# Patient Record
Sex: FEMALE | NOT HISPANIC OR LATINO | URBAN - METROPOLITAN AREA
[De-identification: names, ages, dates, MRNs, and addresses within clinical notes are randomized per-mention and may not be internally consistent; named-entity substitution may affect disease eponyms.]

---

## 2021-07-27 ENCOUNTER — WEB ENCOUNTER (OUTPATIENT)
Dept: URBAN - METROPOLITAN AREA CLINIC 40 | Facility: CLINIC | Age: 36
End: 2021-07-27

## 2021-07-27 ENCOUNTER — OFFICE VISIT (OUTPATIENT)
Dept: URBAN - METROPOLITAN AREA CLINIC 40 | Facility: CLINIC | Age: 36
End: 2021-07-27
Payer: OTHER GOVERNMENT

## 2021-07-27 DIAGNOSIS — K59.04 CHRONIC IDIOPATHIC CONSTIPATION: ICD-10-CM

## 2021-07-27 PROCEDURE — 99204 OFFICE O/P NEW MOD 45 MIN: CPT | Performed by: STUDENT IN AN ORGANIZED HEALTH CARE EDUCATION/TRAINING PROGRAM

## 2021-07-27 RX ORDER — SODIUM SULFATE, MAGNESIUM SULFATE, AND POTASSIUM CHLORIDE 17.75; 2.7; 2.25 G/1; G/1; G/1
12 TABLETS TABLET ORAL
Qty: 24 TABLETS | Refills: 0 | OUTPATIENT
Start: 2021-07-27 | End: 2021-07-28

## 2021-07-27 RX ORDER — ESTRADIOL 0.5 MG/1
1 TABLET TABLET ORAL ONCE A DAY
Status: ACTIVE | COMMUNITY

## 2021-07-27 NOTE — HPI-TODAY'S VISIT:
35-year-old female New to me Comes in today for evaluation of chronic constipation.  Patient denies any use of antimotility medications.  This has been a long-term issue with her.  Patient has been using over-the-counter laxatives with partial relief so far.  Patient has been using these medications only when she is unable to move her bowels beyond 3 days.  Mild nausea but no vomiting.  Abdomen discomfort mostly in the lower part specially when she is unable to move her bowels for few days.  Otherwise denies seeing any blood or mucus in the stool.  No reflux or dysphagia. Patient also has been tested positive for BRCA mutation.  Patient has extensive history of breast cancer, ovarian cancer and uterine cancer in her family in multiple generations.

## 2021-07-28 LAB
BUN/CREATININE RATIO: 14
BUN: 10
CARBON DIOXIDE, TOTAL: 22
CHLORIDE: 101
CREATININE: 0.71
EGFR IF AFRICN AM: 128
EGFR IF NONAFRICN AM: 111
GLUCOSE: 101
HEMATOCRIT: 40.7
HEMOGLOBIN: 13.8
MCH: 30.3
MCHC: 33.9
MCV: 89
NRBC: (no result)
PLATELETS: 260
POTASSIUM: 4.4
RBC: 4.56
RDW: 12.7
SODIUM: 137
TSH: 1.45
WBC: 6.5

## 2021-08-16 ENCOUNTER — OFFICE VISIT (OUTPATIENT)
Dept: URBAN - METROPOLITAN AREA CLINIC 109 | Facility: CLINIC | Age: 36
End: 2021-08-16

## 2021-09-02 ENCOUNTER — OFFICE VISIT (OUTPATIENT)
Dept: URBAN - METROPOLITAN AREA SURGERY CENTER 30 | Facility: SURGERY CENTER | Age: 36
End: 2021-09-02

## 2021-10-05 ENCOUNTER — OFFICE VISIT (OUTPATIENT)
Dept: URBAN - METROPOLITAN AREA CLINIC 40 | Facility: CLINIC | Age: 36
End: 2021-10-05

## 2021-10-28 ENCOUNTER — OFFICE VISIT (OUTPATIENT)
Dept: URBAN - METROPOLITAN AREA SURGERY CENTER 30 | Facility: SURGERY CENTER | Age: 36
End: 2021-10-28

## 2022-02-17 ENCOUNTER — TELEPHONE ENCOUNTER (OUTPATIENT)
Dept: URBAN - METROPOLITAN AREA CLINIC 92 | Facility: CLINIC | Age: 37
End: 2022-02-17

## 2022-02-17 ENCOUNTER — OFFICE VISIT (OUTPATIENT)
Dept: URBAN - METROPOLITAN AREA SURGERY CENTER 30 | Facility: SURGERY CENTER | Age: 37
End: 2022-02-17
Payer: OTHER GOVERNMENT

## 2022-02-17 DIAGNOSIS — K59.04 CHRONIC IDIOPATHIC CONSTIPATION: ICD-10-CM

## 2022-02-17 PROCEDURE — G8907 PT DOC NO EVENTS ON DISCHARG: HCPCS | Performed by: STUDENT IN AN ORGANIZED HEALTH CARE EDUCATION/TRAINING PROGRAM

## 2022-02-17 PROCEDURE — 45330 DIAGNOSTIC SIGMOIDOSCOPY: CPT | Performed by: STUDENT IN AN ORGANIZED HEALTH CARE EDUCATION/TRAINING PROGRAM

## 2022-02-17 RX ORDER — LINACLOTIDE 290 UG/1
1 CAPSULE AT LEAST 30 MINUTES BEFORE THE FIRST MEAL OF THE DAY ON AN EMPTY STOMACH CAPSULE, GELATIN COATED ORAL ONCE A DAY
Qty: 90 | Refills: 3 | OUTPATIENT
Start: 2022-02-17 | End: 2023-02-12

## 2022-02-17 RX ORDER — ESTRADIOL 0.5 MG/1
1 TABLET TABLET ORAL ONCE A DAY
Status: ACTIVE | COMMUNITY

## 2022-02-17 RX ORDER — SODIUM PICOSULFATE, MAGNESIUM OXIDE, AND ANHYDROUS CITRIC ACID 10; 3.5; 12 MG/160ML; G/160ML; G/160ML
160 ML LIQUID ORAL
Qty: 320 MILLILITER | Refills: 0 | OUTPATIENT
Start: 2022-02-17 | End: 2022-02-18

## 2022-04-07 ENCOUNTER — OFFICE VISIT (OUTPATIENT)
Dept: URBAN - METROPOLITAN AREA SURGERY CENTER 30 | Facility: SURGERY CENTER | Age: 37
End: 2022-04-07
Payer: OTHER GOVERNMENT

## 2022-04-07 DIAGNOSIS — K59.09 CHANGE IN BOWEL MOVEMENTS INTERMITTENT CONSTIPATION. URGENCY IN THE MORNING.: ICD-10-CM

## 2022-04-07 DIAGNOSIS — Z80.41 FAMILY HISTORY OF OVARIAN CANCER: ICD-10-CM

## 2022-04-07 DIAGNOSIS — Z80.3 FAMILY HISTORY OF BREAST CANCER: ICD-10-CM

## 2022-04-07 PROCEDURE — G8907 PT DOC NO EVENTS ON DISCHARG: HCPCS | Performed by: STUDENT IN AN ORGANIZED HEALTH CARE EDUCATION/TRAINING PROGRAM

## 2022-04-07 PROCEDURE — 45378 DIAGNOSTIC COLONOSCOPY: CPT | Performed by: STUDENT IN AN ORGANIZED HEALTH CARE EDUCATION/TRAINING PROGRAM

## 2022-04-28 ENCOUNTER — OFFICE VISIT (OUTPATIENT)
Dept: URBAN - METROPOLITAN AREA SURGERY CENTER 30 | Facility: SURGERY CENTER | Age: 37
End: 2022-04-28

## 2022-07-20 ENCOUNTER — TELEPHONE ENCOUNTER (OUTPATIENT)
Dept: URBAN - METROPOLITAN AREA CLINIC 40 | Facility: CLINIC | Age: 37
End: 2022-07-20

## 2022-07-20 RX ORDER — LINACLOTIDE 290 UG/1
1 CAPSULE AT LEAST 30 MINUTES BEFORE THE FIRST MEAL OF THE DAY ON AN EMPTY STOMACH CAPSULE, GELATIN COATED ORAL ONCE A DAY
Qty: 90 | Refills: 0
Start: 2022-02-17 | End: 2022-10-18

## 2022-08-19 ENCOUNTER — OFFICE VISIT (OUTPATIENT)
Dept: URBAN - METROPOLITAN AREA TELEHEALTH 2 | Facility: TELEHEALTH | Age: 37
End: 2022-08-19
Payer: OTHER GOVERNMENT

## 2022-08-19 VITALS — HEIGHT: 64 IN | WEIGHT: 175 LBS | BODY MASS INDEX: 29.88 KG/M2

## 2022-08-19 DIAGNOSIS — K59.04 CHRONIC IDIOPATHIC CONSTIPATION: ICD-10-CM

## 2022-08-19 PROCEDURE — 99213 OFFICE O/P EST LOW 20 MIN: CPT | Performed by: INTERNAL MEDICINE

## 2022-08-19 RX ORDER — LINACLOTIDE 290 UG/1
1 CAPSULE AT LEAST 30 MINUTES BEFORE THE FIRST MEAL OF THE DAY ON AN EMPTY STOMACH CAPSULE, GELATIN COATED ORAL ONCE A DAY
Qty: 90 | Refills: 0 | Status: ACTIVE | COMMUNITY
Start: 2022-02-17 | End: 2022-10-18

## 2022-08-19 RX ORDER — LINACLOTIDE 290 UG/1
1 CAPSULE AT LEAST 30 MINUTES BEFORE THE FIRST MEAL OF THE DAY ON AN EMPTY STOMACH CAPSULE, GELATIN COATED ORAL ONCE A DAY
Qty: 90 | Refills: 0

## 2022-08-19 RX ORDER — ESTRADIOL 0.5 MG/1
1 TABLET TABLET ORAL ONCE A DAY
Status: ACTIVE | COMMUNITY

## 2022-08-19 NOTE — HPI-TODAY'S VISIT:
Ms. Pepe is a 37 year old Black female with a history of chronic constipation.  Patient denies any use of antimotility medications.  This has been a long-term issue with her.  Patient has been using over-the-counter laxatives with partial relief so far.  Patient has been using these medications only when she is unable to move her bowels beyond 3 days.  Mild nausea but no vomiting.  Abdomen discomfort mostly in the lower part specially when she is unable to move her bowels for few days.  Otherwise denies seeing any blood or mucus in the stool.  No reflux or dysphagia. Patient also has been tested positive for BRCA mutation.  Patient has extensive history of breast cancer, ovarian cancer and uterine cancer in her family in multiple generations. Normal colonoscopy this April, repeated after poor prep in [place]. A 5-year surveillance recommended. She admits that overall the Linzess 290 mcg daily has worked for her.  She is drinking more water and remains active, exercising weekly.  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  With this she admits to eating more starchy foods and cheese.  No new complaints.

## 2022-12-08 ENCOUNTER — TELEPHONE ENCOUNTER (OUTPATIENT)
Dept: URBAN - METROPOLITAN AREA CLINIC 40 | Facility: CLINIC | Age: 37
End: 2022-12-08

## 2022-12-29 ENCOUNTER — ERX REFILL RESPONSE (OUTPATIENT)
Dept: URBAN - METROPOLITAN AREA CLINIC 40 | Facility: CLINIC | Age: 37
End: 2022-12-29

## 2022-12-29 ENCOUNTER — TELEPHONE ENCOUNTER (OUTPATIENT)
Dept: URBAN - METROPOLITAN AREA CLINIC 40 | Facility: CLINIC | Age: 37
End: 2022-12-29

## 2022-12-29 RX ORDER — LINACLOTIDE 290 UG/1
1 CAPSULE AT LEAST 30 MINUTES BEFORE THE FIRST MEAL OF THE DAY ON AN EMPTY STOMACH CAPSULE, GELATIN COATED ORAL ONCE A DAY
Qty: 90 | Refills: 0 | OUTPATIENT

## 2022-12-29 RX ORDER — LINACLOTIDE 290 UG/1
TAKE 1 CAPSULE DAILY AT LEAST 30 MINUTES BEFORE THE FIRST MEAL OF THE DAY ON AN EMPTY STOMACH CAPSULE, GELATIN COATED ORAL
Qty: 90 CAPSULE | Refills: 3 | OUTPATIENT

## 2023-02-10 ENCOUNTER — TELEPHONE ENCOUNTER (OUTPATIENT)
Dept: URBAN - METROPOLITAN AREA CLINIC 40 | Facility: CLINIC | Age: 38
End: 2023-02-10

## 2023-03-03 ENCOUNTER — OFFICE VISIT (OUTPATIENT)
Dept: URBAN - METROPOLITAN AREA TELEHEALTH 2 | Facility: TELEHEALTH | Age: 38
End: 2023-03-03

## 2023-03-03 PROBLEM — 82934008: Status: ACTIVE | Noted: 2021-07-27

## 2023-03-03 RX ORDER — LINACLOTIDE 290 UG/1
TAKE 1 CAPSULE DAILY AT LEAST 30 MINUTES BEFORE THE FIRST MEAL OF THE DAY ON AN EMPTY STOMACH CAPSULE, GELATIN COATED ORAL
Qty: 90 CAPSULE | Refills: 3 | Status: ACTIVE | COMMUNITY

## 2023-03-03 RX ORDER — LINACLOTIDE 290 UG/1
1 CAPSULE AT LEAST 30 MINUTES BEFORE THE FIRST MEAL OF THE DAY ON AN EMPTY STOMACH CAPSULE, GELATIN COATED ORAL ONCE A DAY
Qty: 90 | Refills: 0

## 2023-03-03 RX ORDER — ESTRADIOL 0.5 MG/1
1 TABLET TABLET ORAL ONCE A DAY
Status: ACTIVE | COMMUNITY

## 2023-03-03 NOTE — HPI-TODAY'S VISIT:
Ms. Pepe is a 37 year old Black female with a history of chronic constipation.  Patient denies any use of antimotility medications.  This has been a long-term issue with her.  Patient has been using over-the-counter laxatives with partial relief so far.  Patient has been using these medications only when she is unable to move her bowels beyond 3 days.  Mild nausea but no vomiting.  Abdomen discomfort mostly in the lower part specially when she is unable to move her bowels for few days.  Otherwise denies seeing any blood or mucus in the stool.  No reflux or dysphagia. Patient also has been tested positive for BRCA mutation.  Patient has extensive history of breast cancer, ovarian cancer and uterine cancer in her family in multiple generations. Normal colonoscopy April 2022, repeated after poor prep in February. A 5-year surveillance recommended. With last visit 8/19/22, she admitted the Linzess 290 mcg daily has worked for her.  She is drinking more water and remains active, exercising weekly.  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  With this she admits to eating more starchy foods and cheese.  No new complaints.

## 2023-05-05 ENCOUNTER — OFFICE VISIT (OUTPATIENT)
Dept: URBAN - METROPOLITAN AREA TELEHEALTH 2 | Facility: TELEHEALTH | Age: 38
End: 2023-05-05
Payer: OTHER GOVERNMENT

## 2023-05-05 VITALS — WEIGHT: 175 LBS

## 2023-05-05 DIAGNOSIS — K59.09 CHANGE IN BOWEL MOVEMENTS INTERMITTENT CONSTIPATION. URGENCY IN THE MORNING.: ICD-10-CM

## 2023-05-05 DIAGNOSIS — R14.0 BLOATING: ICD-10-CM

## 2023-05-05 PROCEDURE — 99214 OFFICE O/P EST MOD 30 MIN: CPT | Performed by: INTERNAL MEDICINE

## 2023-05-05 RX ORDER — LINACLOTIDE 290 UG/1
1 CAPSULE AT LEAST 30 MINUTES BEFORE THE FIRST MEAL OF THE DAY ON AN EMPTY STOMACH CAPSULE, GELATIN COATED ORAL ONCE A DAY
Qty: 90 | Refills: 0 | Status: ACTIVE | COMMUNITY

## 2023-05-05 RX ORDER — LINACLOTIDE 290 UG/1
1 CAPSULE AT LEAST 30 MINUTES BEFORE THE FIRST MEAL OF THE DAY ON AN EMPTY STOMACH CAPSULE, GELATIN COATED ORAL ONCE A DAY
Qty: 90 CAPSULE | Refills: 1

## 2023-05-05 RX ORDER — ESTRADIOL 0.5 MG/1
1 TABLET TABLET ORAL ONCE A DAY
Status: ACTIVE | COMMUNITY

## 2023-05-05 RX ORDER — LINACLOTIDE 290 UG/1
TAKE 1 CAPSULE DAILY AT LEAST 30 MINUTES BEFORE THE FIRST MEAL OF THE DAY ON AN EMPTY STOMACH CAPSULE, GELATIN COATED ORAL
Qty: 90 CAPSULE | Refills: 3 | Status: ACTIVE | COMMUNITY

## 2023-05-05 NOTE — HPI-TODAY'S VISIT:
Ms. Pepe is a 37 year old Black female with a history of chronic constipation.  Patient denies any use of antimotility medications.  This has been a long-term issue with her.  Patient has been using over-the-counter laxatives with partial relief so far.  Patient has been using these medications only when she is unable to move her bowels beyond 3 days.  Mild nausea but no vomiting.  Abdomen discomfort mostly in the lower part specially when she is unable to move her bowels for few days.  Otherwise denies seeing any blood or mucus in the stool.  No reflux or dysphagia. Patient also has been tested positive for BRCA mutation.  Patient has extensive history of breast cancer, ovarian cancer and uterine cancer in her family in multiple generations. Normal colonoscopy April 2022, repeated after poor prep in February. A 5-year surveillance recommended. With last visit 8/19/22, she admitted the Linzess 290 mcg daily has worked for her.  She is drinking more water and remains active, exercising weekly.  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 Today, she reports eating less starchy foods.  She seems to do better in response to Linzess to 90 mcg when she takes it with a meal.  No side effects reported.  She has noticed some increased gas and bloating but admits that she often eats her lunch meal at work very quickly.  She has also been eating before lying flat at night.  Denies any nausea, vomiting or dysphagia.  Good appetite and has had some weight loss which was intentional.  No other new complaints.  She does need refills on her Linzess.

## 2023-08-11 ENCOUNTER — OFFICE VISIT (OUTPATIENT)
Dept: URBAN - METROPOLITAN AREA TELEHEALTH 2 | Facility: TELEHEALTH | Age: 38
End: 2023-08-11

## 2023-08-11 VITALS — HEIGHT: 64 IN | BODY MASS INDEX: 29.71 KG/M2 | WEIGHT: 174 LBS

## 2023-08-11 RX ORDER — LINACLOTIDE 290 UG/1
TAKE 1 CAPSULE DAILY AT LEAST 30 MINUTES BEFORE THE FIRST MEAL OF THE DAY ON AN EMPTY STOMACH CAPSULE, GELATIN COATED ORAL
Qty: 90 CAPSULE | Refills: 3 | Status: ACTIVE | COMMUNITY

## 2023-08-11 RX ORDER — LINACLOTIDE 290 UG/1
1 CAPSULE AT LEAST 30 MINUTES BEFORE THE FIRST MEAL OF THE DAY ON AN EMPTY STOMACH CAPSULE, GELATIN COATED ORAL ONCE A DAY
Qty: 90 CAPSULE | Refills: 1

## 2023-08-11 RX ORDER — ESTRADIOL 0.5 MG/1
1 TABLET TABLET ORAL ONCE A DAY
Status: ACTIVE | COMMUNITY

## 2023-08-11 RX ORDER — LINACLOTIDE 290 UG/1
1 CAPSULE AT LEAST 30 MINUTES BEFORE THE FIRST MEAL OF THE DAY ON AN EMPTY STOMACH CAPSULE, GELATIN COATED ORAL ONCE A DAY
Qty: 90 CAPSULE | Refills: 1 | Status: ACTIVE | COMMUNITY

## 2023-10-09 ENCOUNTER — OFFICE VISIT (OUTPATIENT)
Dept: URBAN - METROPOLITAN AREA TELEHEALTH 2 | Facility: TELEHEALTH | Age: 38
End: 2023-10-09
Payer: OTHER GOVERNMENT

## 2023-10-09 DIAGNOSIS — R14.0 BLOATING: ICD-10-CM

## 2023-10-09 DIAGNOSIS — K59.04 CHRONIC IDIOPATHIC CONSTIPATION: ICD-10-CM

## 2023-10-09 PROCEDURE — 99214 OFFICE O/P EST MOD 30 MIN: CPT | Performed by: PHYSICIAN ASSISTANT

## 2023-10-09 RX ORDER — LINACLOTIDE 290 UG/1
1 CAPSULE AT LEAST 30 MINUTES BEFORE THE FIRST MEAL OF THE DAY ON AN EMPTY STOMACH CAPSULE, GELATIN COATED ORAL ONCE A DAY
Qty: 90 CAPSULE | Refills: 1 | Status: ACTIVE | COMMUNITY

## 2023-10-09 RX ORDER — LINACLOTIDE 290 UG/1
1 CAPSULE AT LEAST 30 MINUTES BEFORE THE FIRST MEAL OF THE DAY ON AN EMPTY STOMACH CAPSULE, GELATIN COATED ORAL ONCE A DAY
Qty: 90 CAPSULE | Refills: 1

## 2023-10-09 RX ORDER — ESTRADIOL 0.5 MG/1
1 TABLET TABLET ORAL ONCE A DAY
Status: ACTIVE | COMMUNITY

## 2023-10-09 NOTE — HPI-TODAY'S VISIT:
Ms. Pepe is a 38 year old Black female with a history of chronic constipation.  Patient denies any use of antimotility medications.  This has been a long-term issue with her.  Patient has been using over-the-counter laxatives with partial relief so far.  Patient has been using these medications only when she is unable to move her bowels beyond 3 days.  Mild nausea but no vomiting.  Abdomen discomfort mostly in the lower part specially when she is unable to move her bowels for few days.  Otherwise denies seeing any blood or mucus in the stool.  No reflux or dysphagia. Patient also has been tested positive for BRCA mutation.  Patient has extensive history of breast cancer, ovarian cancer and uterine cancer in her family in multiple generations. Normal colonoscopy April 2022, repeated after poor prep in February. A 5-year surveillance recommended. With last visit 8/19/22, she admitted the Linzess 290 mcg daily has worked for her.  She is drinking more water and remains active, exercising weekly. Today, she reports eating less starchy foods.  She seems to do better in response to Linzess 290 mcg when she takes it with a meal.  No side effects reported.  Denies any nausea, vomiting or dysphagia.  Good appetite and has lost 10 pounds, which was intentional.  No other new complaints.  She does need refills on her Linzess.

## 2023-10-23 ENCOUNTER — TELEPHONE ENCOUNTER (OUTPATIENT)
Dept: URBAN - METROPOLITAN AREA CLINIC 39 | Facility: CLINIC | Age: 38
End: 2023-10-23

## 2023-11-17 ENCOUNTER — TELEPHONE ENCOUNTER (OUTPATIENT)
Dept: URBAN - METROPOLITAN AREA CLINIC 40 | Facility: CLINIC | Age: 38
End: 2023-11-17

## 2024-01-18 ENCOUNTER — ERX REFILL RESPONSE (OUTPATIENT)
Dept: URBAN - METROPOLITAN AREA CLINIC 40 | Facility: CLINIC | Age: 39
End: 2024-01-18

## 2024-01-18 RX ORDER — LINACLOTIDE 290 UG/1
TAKE 1 CAPSULE DAILY AT LEAST 30 MINUTES BEFORE THE FIRST MEAL OF THE DAY ON AN EMPTY STOMACH CAPSULE, GELATIN COATED ORAL
Qty: 90 CAPSULE | Refills: 3 | OUTPATIENT

## 2024-01-18 RX ORDER — LINACLOTIDE 290 UG/1
TAKE 1 CAPSULE DAILY AT LEAST 30 MINUTES BEFORE THE FIRST MEAL OF THE DAY ON AN EMPTY STOMACH CAPSULE, GELATIN COATED ORAL
Qty: 90 CAPSULE | Refills: 4 | OUTPATIENT

## 2024-04-26 ENCOUNTER — TELEP (OUTPATIENT)
Dept: URBAN - METROPOLITAN AREA TELEHEALTH 2 | Facility: TELEHEALTH | Age: 39
End: 2024-04-26
Payer: OTHER GOVERNMENT

## 2024-04-26 VITALS — HEIGHT: 64 IN | BODY MASS INDEX: 29.88 KG/M2 | WEIGHT: 175 LBS

## 2024-04-26 DIAGNOSIS — K59.04 CHRONIC IDIOPATHIC CONSTIPATION: ICD-10-CM

## 2024-04-26 DIAGNOSIS — R14.0 BLOATING: ICD-10-CM

## 2024-04-26 PROCEDURE — 99442 PHONE E/M BY PHYS 11-20 MIN: CPT | Performed by: PHYSICIAN ASSISTANT

## 2024-04-26 RX ORDER — LINACLOTIDE 290 UG/1
1 CAPSULE AT LEAST 30 MINUTES BEFORE THE FIRST MEAL OF THE DAY ON AN EMPTY STOMACH CAPSULE, GELATIN COATED ORAL ONCE A DAY
Qty: 90 CAPSULE | Refills: 1

## 2024-04-26 RX ORDER — ESTRADIOL 0.5 MG/1
1 TABLET TABLET ORAL ONCE A DAY
Status: ACTIVE | COMMUNITY

## 2024-04-26 RX ORDER — LINACLOTIDE 290 UG/1
TAKE 1 CAPSULE DAILY AT LEAST 30 MINUTES BEFORE THE FIRST MEAL OF THE DAY ON AN EMPTY STOMACH CAPSULE, GELATIN COATED ORAL
Qty: 90 CAPSULE | Refills: 3 | Status: ACTIVE | COMMUNITY

## 2024-04-26 NOTE — HPI-TODAY'S VISIT:
Ms. Pepe is a 38 year old Black female last seen for f/u 10/9/23 with a history of chronic constipation.  Patient denies any use of antimotility medications.  This has been a long-term issue with her. Patient also has been tested positive for BRCA mutation.  Patient has extensive history of breast cancer, ovarian cancer and uterine cancer in her family in multiple generations. Normal colonoscopy April 2022, repeated after poor prep in February. A 5-year surveillance recommended. She seems to do better in response to Linzess 290 mcg when she takes it with a meal.  No side effects reported.  Denies any nausea, vomiting or dysphagia.  Good appetite and weight stable.  No other new complaints.

## 2024-10-23 ENCOUNTER — DASHBOARD ENCOUNTERS (OUTPATIENT)
Age: 39
End: 2024-10-23

## 2024-10-25 ENCOUNTER — OFFICE VISIT (OUTPATIENT)
Dept: URBAN - METROPOLITAN AREA TELEHEALTH 2 | Facility: TELEHEALTH | Age: 39
End: 2024-10-25

## 2024-10-25 RX ORDER — ESTRADIOL 0.5 MG/1
1 TABLET TABLET ORAL ONCE A DAY
Status: ACTIVE | COMMUNITY

## 2024-10-25 RX ORDER — LINACLOTIDE 290 UG/1
1 CAPSULE AT LEAST 30 MINUTES BEFORE THE FIRST MEAL OF THE DAY ON AN EMPTY STOMACH CAPSULE, GELATIN COATED ORAL ONCE A DAY
Qty: 90 CAPSULE | Refills: 1 | Status: ACTIVE | COMMUNITY

## 2024-10-25 RX ORDER — LINACLOTIDE 290 UG/1
1 CAPSULE AT LEAST 30 MINUTES BEFORE THE FIRST MEAL OF THE DAY ON AN EMPTY STOMACH CAPSULE, GELATIN COATED ORAL ONCE A DAY
Qty: 90 CAPSULE | Refills: 1

## 2024-10-25 NOTE — HPI-TODAY'S VISIT:
Ms. Pepe is a 39 year old Black female last seen for f/u 4/26/24, with a history of chronic constipation.  Patient denies any use of antimotility medications.  This has been a long-term issue with her. Patient also has been tested positive for BRCA mutation.  Patient has extensive history of breast cancer, ovarian cancer and uterine cancer in her family in multiple generations.  Normal colonoscopy April 2022, repeated after poor prep in February. A 5-year surveillance recommended. She seems to do better in response to Linzess 290 mcg when she takes it with a meal.  No side effects reported.  Denies any nausea, vomiting or dysphagia.  Good appetite and weight stable.  No other new complaints.